# Patient Record
Sex: MALE | Race: WHITE | Employment: FULL TIME | URBAN - METROPOLITAN AREA
[De-identification: names, ages, dates, MRNs, and addresses within clinical notes are randomized per-mention and may not be internally consistent; named-entity substitution may affect disease eponyms.]

---

## 2021-02-10 ENCOUNTER — OFFICE VISIT (OUTPATIENT)
Dept: FAMILY MEDICINE CLINIC | Age: 46
End: 2021-02-10
Payer: COMMERCIAL

## 2021-02-10 VITALS
TEMPERATURE: 97.3 F | HEART RATE: 81 BPM | BODY MASS INDEX: 38.17 KG/M2 | HEIGHT: 73 IN | SYSTOLIC BLOOD PRESSURE: 140 MMHG | WEIGHT: 288 LBS | OXYGEN SATURATION: 96 % | DIASTOLIC BLOOD PRESSURE: 84 MMHG

## 2021-02-10 DIAGNOSIS — F32.1 CURRENT MODERATE EPISODE OF MAJOR DEPRESSIVE DISORDER, UNSPECIFIED WHETHER RECURRENT (HCC): ICD-10-CM

## 2021-02-10 DIAGNOSIS — E66.9 OBESITY (BMI 35.0-39.9 WITHOUT COMORBIDITY): ICD-10-CM

## 2021-02-10 DIAGNOSIS — F17.210 CIGARETTE NICOTINE DEPENDENCE WITHOUT COMPLICATION: ICD-10-CM

## 2021-02-10 DIAGNOSIS — Z76.89 ENCOUNTER TO ESTABLISH CARE: Primary | ICD-10-CM

## 2021-02-10 DIAGNOSIS — Z76.89 ENCOUNTER TO ESTABLISH CARE: ICD-10-CM

## 2021-02-10 DIAGNOSIS — R73.9 HYPERGLYCEMIA: ICD-10-CM

## 2021-02-10 LAB
ALBUMIN SERPL-MCNC: 4.7 G/DL (ref 3.5–5.2)
ALP BLD-CCNC: 66 U/L (ref 40–129)
ALT SERPL-CCNC: 52 U/L (ref 0–40)
ANION GAP SERPL CALCULATED.3IONS-SCNC: 8 MMOL/L (ref 7–16)
AST SERPL-CCNC: 28 U/L (ref 0–39)
BASOPHILS ABSOLUTE: 0.08 E9/L (ref 0–0.2)
BASOPHILS RELATIVE PERCENT: 0.8 % (ref 0–2)
BILIRUB SERPL-MCNC: 0.3 MG/DL (ref 0–1.2)
BUN BLDV-MCNC: 11 MG/DL (ref 6–20)
CALCIUM SERPL-MCNC: 9.7 MG/DL (ref 8.6–10.2)
CHLORIDE BLD-SCNC: 103 MMOL/L (ref 98–107)
CHOLESTEROL, TOTAL: 193 MG/DL (ref 0–199)
CHP ED QC CHECK: NORMAL
CO2: 26 MMOL/L (ref 22–29)
CREAT SERPL-MCNC: 1.1 MG/DL (ref 0.7–1.2)
EOSINOPHILS ABSOLUTE: 0.22 E9/L (ref 0.05–0.5)
EOSINOPHILS RELATIVE PERCENT: 2.1 % (ref 0–6)
GFR AFRICAN AMERICAN: >60
GFR NON-AFRICAN AMERICAN: >60 ML/MIN/1.73
GLUCOSE BLD-MCNC: 71 MG/DL (ref 74–99)
GLUCOSE BLD-MCNC: 87 MG/DL
HBA1C MFR BLD: 6.2 %
HCT VFR BLD CALC: 52.4 % (ref 37–54)
HDLC SERPL-MCNC: 44 MG/DL
HEMOGLOBIN: 18.1 G/DL (ref 12.5–16.5)
IMMATURE GRANULOCYTES #: 0.06 E9/L
IMMATURE GRANULOCYTES %: 0.6 % (ref 0–5)
LDL CHOLESTEROL CALCULATED: 120 MG/DL (ref 0–99)
LYMPHOCYTES ABSOLUTE: 4.07 E9/L (ref 1.5–4)
LYMPHOCYTES RELATIVE PERCENT: 39.4 % (ref 20–42)
MCH RBC QN AUTO: 32.2 PG (ref 26–35)
MCHC RBC AUTO-ENTMCNC: 34.5 % (ref 32–34.5)
MCV RBC AUTO: 93.2 FL (ref 80–99.9)
MONOCYTES ABSOLUTE: 0.9 E9/L (ref 0.1–0.95)
MONOCYTES RELATIVE PERCENT: 8.7 % (ref 2–12)
NEUTROPHILS ABSOLUTE: 5 E9/L (ref 1.8–7.3)
NEUTROPHILS RELATIVE PERCENT: 48.4 % (ref 43–80)
PDW BLD-RTO: 12.8 FL (ref 11.5–15)
PLATELET # BLD: 268 E9/L (ref 130–450)
PMV BLD AUTO: 10.9 FL (ref 7–12)
POTASSIUM SERPL-SCNC: 4.8 MMOL/L (ref 3.5–5)
RBC # BLD: 5.62 E12/L (ref 3.8–5.8)
SODIUM BLD-SCNC: 137 MMOL/L (ref 132–146)
TOTAL PROTEIN: 8.1 G/DL (ref 6.4–8.3)
TRIGL SERPL-MCNC: 145 MG/DL (ref 0–149)
TSH SERPL DL<=0.05 MIU/L-ACNC: 1.63 UIU/ML (ref 0.27–4.2)
VITAMIN D 25-HYDROXY: 15 NG/ML (ref 30–100)
VLDLC SERPL CALC-MCNC: 29 MG/DL
WBC # BLD: 10.3 E9/L (ref 4.5–11.5)

## 2021-02-10 PROCEDURE — 82962 GLUCOSE BLOOD TEST: CPT | Performed by: FAMILY MEDICINE

## 2021-02-10 PROCEDURE — 99203 OFFICE O/P NEW LOW 30 MIN: CPT | Performed by: FAMILY MEDICINE

## 2021-02-10 PROCEDURE — 83036 HEMOGLOBIN GLYCOSYLATED A1C: CPT | Performed by: FAMILY MEDICINE

## 2021-02-10 PROCEDURE — G8427 DOCREV CUR MEDS BY ELIG CLIN: HCPCS | Performed by: FAMILY MEDICINE

## 2021-02-10 PROCEDURE — G8484 FLU IMMUNIZE NO ADMIN: HCPCS | Performed by: FAMILY MEDICINE

## 2021-02-10 PROCEDURE — 4004F PT TOBACCO SCREEN RCVD TLK: CPT | Performed by: FAMILY MEDICINE

## 2021-02-10 PROCEDURE — G8417 CALC BMI ABV UP PARAM F/U: HCPCS | Performed by: FAMILY MEDICINE

## 2021-02-10 RX ORDER — QUETIAPINE 200 MG/1
200 TABLET, FILM COATED, EXTENDED RELEASE ORAL NIGHTLY
COMMUNITY
End: 2021-02-10 | Stop reason: SDUPTHER

## 2021-02-10 RX ORDER — QUETIAPINE 200 MG/1
200 TABLET, FILM COATED, EXTENDED RELEASE ORAL NIGHTLY
Qty: 30 TABLET | Refills: 0 | Status: SHIPPED
Start: 2021-02-10 | End: 2021-03-15 | Stop reason: SDUPTHER

## 2021-02-10 SDOH — ECONOMIC STABILITY: INCOME INSECURITY: HOW HARD IS IT FOR YOU TO PAY FOR THE VERY BASICS LIKE FOOD, HOUSING, MEDICAL CARE, AND HEATING?: NOT ASKED

## 2021-02-10 SDOH — ECONOMIC STABILITY: FOOD INSECURITY: WITHIN THE PAST 12 MONTHS, THE FOOD YOU BOUGHT JUST DIDN'T LAST AND YOU DIDN'T HAVE MONEY TO GET MORE.: SOMETIMES TRUE

## 2021-02-10 ASSESSMENT — PATIENT HEALTH QUESTIONNAIRE - PHQ9
SUM OF ALL RESPONSES TO PHQ QUESTIONS 1-9: 1
SUM OF ALL RESPONSES TO PHQ QUESTIONS 1-9: 1

## 2021-02-10 NOTE — PATIENT INSTRUCTIONS
LOW SALT,LOW CARB. AND LOW FAT DIET. CONTINUE CURRENT MEDICATIONS TAKING REGULARLY. REGULAR WALKING ADVISED. ADVISED WEIGHT REDUCTION. ADVISED TO QUIT SMOKING. FASTING FOR LAB WORK ONE MORNING. NEXT APPOINTMENT IN 1 MONTH.

## 2021-02-10 NOTE — PROGRESS NOTES
OFFICE PROGRESS NOTE      SUBJECTIVE:        Patient ID:   James Dutton is a 41 y.o. male who presents for   Chief Complaint   Patient presents with   [facility]  preview. ingrown toe nail pain, diabetes type 2, pt brother ferrtin levels are alwatys low wants it checked     Health Maintenance     Tdap due,lipid screen, flu shot (no), Hep c screen due            HPI:     FEELS GOOD. ON MEDICATIONS FOR MOOD CONTROL. BLOOD SUGAR FLUCTUATES AND DOES GO TOO LOW OFF AND ON.  WATCHING DIET GOOD. PHYSICALLY ACTIVE JOB  FOR EXERCISE. TAKING MEDICATIONS REGULARLY. SMOKING 1/2 PACK DAILY. TRYING TO SLOW DOWN. Prior to Admission medications    Medication Sig Start Date End Date Taking? Authorizing Provider   QUEtiapine (SEROQUEL XR) 200 MG extended release tablet Take 200 mg by mouth nightly   Yes Historical Provider, MD   sertraline (ZOLOFT) 50 MG tablet Take 50 mg by mouth daily   Yes Historical Provider, MD     Social History     Socioeconomic History    Marital status:      Spouse name: None    Number of children: None    Years of education: None    Highest education level: None   Occupational History    None   Social Needs    Financial resource strain: None    Food insecurity     Worry: Sometimes true     Inability: Sometimes true    Transportation needs     Medical: No     Non-medical: No   Tobacco Use    Smoking status: Current Every Day Smoker     Packs/day: 0.25     Years: 15.00     Pack years: 3.75     Types: Cigarettes    Smokeless tobacco: Never Used   Substance and Sexual Activity    Alcohol use:  Yes     Alcohol/week: 1.0 standard drinks     Types: 1 Shots of liquor per week     Comment: once a onth    Drug use: Never    Sexual activity: Yes     Partners: Female   Lifestyle    Physical activity     Days per week: None     Minutes per session: None    Stress: None   Relationships    Social connections Head: Normocephalic. No masses, lesions or tenderness noted  Eyes: Conjunctivae appear normal. PERLE  Ears: External ears normal  Nose/Sinuses: Nares normal. Septum midline. Mucosa normal. No drainage  Oropharynx: Oropharynx clear with no exudate seen  Neck: Neck supple. No jugular venous distension, lymphadenopathy or thyromegaly Trachea midline  Chest:  Normal. Movements are Normal and Equal.  Lungs: Lungs clear to auscultation bilaterally. No ronchi, crackles or wheezes  Heart: S1 S2  Regular rate and rhythm. No rub, murmur or gallop  Abdomen: Abdomen soft, non-tender. BS normal. No masses, organomegaly. Back: Grossly Normal and Equal. DTR are Normal. SLR is Normal.  Extremities:Pedal pulses are normal.  Musculoskeletal: Muscular strength appears intact. No joint effusion, tenderness, swelling or warmth  Neuro:  No focal motor or sensory deficits        ASSESSMENT     Patient Active Problem List    Diagnosis Date Noted    Current moderate episode of major depressive disorder ([facility] 02/10/2021    Obesity (BMI 35.0-39.9 without comorbidity) 02/10/2021    Cigarette nicotine dependence without complication 56/68/9838        Diagnosis:     ICD-10-CM    1. Encounter to establish care  Z76.89 CBC Auto Differential     Comprehensive Metabolic Panel     Lipid Panel     Vitamin D 25 Hydroxy     TSH without Reflex     T4   2. Hyperglycemia  R73.9 POCT glycosylated hemoglobin (Hb A1C)     POCT Glucose   3. Current moderate episode of major depressive disorder, unspecified whether recurrent ([facility]  F32.1    4. Obesity (BMI 35.0-39.9 without comorbidity)  E66.9    5. Cigarette nicotine dependence without complication  I60.452        PLAN:           Patient Instructions   LOW SALT,LOW CARB. AND LOW FAT DIET. CONTINUE CURRENT MEDICATIONS TAKING REGULARLY. REGULAR WALKING ADVISED. ADVISED WEIGHT REDUCTION. ADVISED TO QUIT SMOKING. FASTING FOR LAB WORK ONE MORNING. NEXT APPOINTMENT IN 1 MONTH.

## 2021-03-15 ENCOUNTER — OFFICE VISIT (OUTPATIENT)
Dept: FAMILY MEDICINE CLINIC | Age: 46
End: 2021-03-15
Payer: COMMERCIAL

## 2021-03-15 VITALS
SYSTOLIC BLOOD PRESSURE: 122 MMHG | WEIGHT: 295 LBS | HEIGHT: 73 IN | BODY MASS INDEX: 39.1 KG/M2 | HEART RATE: 89 BPM | TEMPERATURE: 97 F | DIASTOLIC BLOOD PRESSURE: 88 MMHG | RESPIRATION RATE: 16 BRPM | OXYGEN SATURATION: 97 %

## 2021-03-15 DIAGNOSIS — E55.9 HYPOVITAMINOSIS D: ICD-10-CM

## 2021-03-15 DIAGNOSIS — Z11.59 NEED FOR HEPATITIS C SCREENING TEST: ICD-10-CM

## 2021-03-15 DIAGNOSIS — F17.210 CIGARETTE NICOTINE DEPENDENCE WITHOUT COMPLICATION: ICD-10-CM

## 2021-03-15 DIAGNOSIS — D58.2 ELEVATED HEMOGLOBIN (HCC): ICD-10-CM

## 2021-03-15 DIAGNOSIS — E66.9 OBESITY (BMI 35.0-39.9 WITHOUT COMORBIDITY): ICD-10-CM

## 2021-03-15 DIAGNOSIS — F32.1 CURRENT MODERATE EPISODE OF MAJOR DEPRESSIVE DISORDER, UNSPECIFIED WHETHER RECURRENT (HCC): ICD-10-CM

## 2021-03-15 DIAGNOSIS — E78.00 PRIMARY HYPERCHOLESTEROLEMIA: Primary | ICD-10-CM

## 2021-03-15 DIAGNOSIS — Z11.4 ENCOUNTER FOR SCREENING FOR HIV: ICD-10-CM

## 2021-03-15 PROCEDURE — G8427 DOCREV CUR MEDS BY ELIG CLIN: HCPCS | Performed by: FAMILY MEDICINE

## 2021-03-15 PROCEDURE — 4004F PT TOBACCO SCREEN RCVD TLK: CPT | Performed by: FAMILY MEDICINE

## 2021-03-15 PROCEDURE — G8484 FLU IMMUNIZE NO ADMIN: HCPCS | Performed by: FAMILY MEDICINE

## 2021-03-15 PROCEDURE — G8417 CALC BMI ABV UP PARAM F/U: HCPCS | Performed by: FAMILY MEDICINE

## 2021-03-15 PROCEDURE — 99214 OFFICE O/P EST MOD 30 MIN: CPT | Performed by: FAMILY MEDICINE

## 2021-03-15 RX ORDER — CHOLECALCIFEROL (VITAMIN D3) 1250 MCG
1 CAPSULE ORAL WEEKLY
Qty: 12 CAPSULE | Refills: 1 | Status: SHIPPED
Start: 2021-03-15 | End: 2021-07-12 | Stop reason: SDUPTHER

## 2021-03-15 RX ORDER — QUETIAPINE 200 MG/1
200 TABLET, FILM COATED, EXTENDED RELEASE ORAL NIGHTLY
Qty: 30 TABLET | Refills: 3 | Status: SHIPPED
Start: 2021-03-15 | End: 2021-07-12 | Stop reason: SDUPTHER

## 2021-03-15 NOTE — PROGRESS NOTES
OFFICE PROGRESS NOTE      SUBJECTIVE:        Patient ID:   Daxa Richard is a 41 y.o. male who presents for   Chief Complaint   Patient presents with   [address] [facility] Maintenance     due for hep c and pneum and dtap and hiv     Weight Loss     would like some assistants           HPI:     FEELS GOOD. WATCHING DIET BUT NOT GOOD. PHYSICALLY ACTIVE  FOR EXERCISE. TAKING MEDICATIONS REGULARLY. DISCUSSED AND ADVISED ON DIET CONTROL AND EXERCISE. Prior to Admission medications    Medication Sig Start Date End Date Taking? Authorizing Provider   Cholecalciferol (VITAMIN D3) 1.25 MG (15852 UT) CAPS Take 1 capsule by mouth once a week 3/15/21  Yes Carmel Tovar MD   QUEtiapine (SEROQUEL XR) 200 MG extended release tablet Take 1 tablet by mouth nightly 3/15/21  Yes Carmel Tovar MD   sertraline (ZOLOFT) 50 MG tablet Take 1 tablet by mouth daily 3/15/21  Yes Carmel Tovar MD     Social History     Socioeconomic History    Marital status:      Spouse name: None    Number of children: None    Years of education: None    Highest education level: None   Occupational History    None   Social Needs    Financial resource strain: None    Food insecurity     Worry: Sometimes true     Inability: Sometimes true    Transportation needs     Medical: No     Non-medical: No   Tobacco Use    Smoking status: Current Every Day Smoker     Packs/day: 0.25     Years: 15.00     Pack years: 3.75     Types: Cigarettes    Smokeless tobacco: Never Used   Substance and Sexual Activity    Alcohol use:  Yes     Alcohol/week: 1.0 standard drinks     Types: 1 Shots of liquor per week     Comment: once a onth    Drug use: Never    Sexual activity: Yes     Partners: Female   Lifestyle    Physical activity     Days per week: None     Minutes per session: None    Stress: None   Relationships    Social connections     Talks on phone: None     Gets together: None Attends Jehovah's witness service: None     Active member of club or organization: None     Attends meetings of clubs or organizations: None     Relationship status: None    Intimate partner violence     Fear of current or ex partner: None     Emotionally abused: None     Physically abused: None     Forced sexual activity: None   Other Topics Concern    None   Social History Narrative    None       I have reviewed Selvin's allergies, medications, problem list, medical, social and family history and have updated as needed in the electronic medical record  Review Of Systems:    Skin: no abnormal pigmentation, rash, scaling, itching, masses, hair or nail changes  Eyes: no blurring, diplopia, or eye pain  Ears/Nose/Throat: no hearing loss, tinnitus, vertigo, nosebleed, nasal congestion, rhinorrhea, sore throat  Respiratory: no cough, pleuritic chest pain, dyspnea, or wheezing  Cardiovascular: no chest pain, angina, dyspnea on exertion, orthopnea, PND, palpitations, or claudication  Gastrointestinal: no nausea, vomiting, heartburn, diarrhea, constipation, bloating,  abdominal pain, or blood per rectum. Appetite is good  Genitourinary: no urinary urgency, frequency, dysuria, nocturia, hesitancy, or incontinence  Musculoskeletal:  Ambulating well  Neurologic: no paralysis, paresis, paresthesia, seizures, tremors, or headaches  Hematologic/Lymphatic/Immunologic: no anemia, abnormal bleeding/bruising, fever, chills, night sweats, swollen glands, or unexplained weight loss  Endocrine: no heat or cold intolerance and no polyphagia, polydipsia, or polyuria        OBJECTIVE:     VS:  Wt Readings from Last 3 Encounters:   03/15/21 295 lb (133.8 kg)   02/10/21 288 lb (130.6 kg)     Temp Readings from Last 3 Encounters:   03/15/21 97 °F (36.1 °C)   02/10/21 97.3 °F (36.3 °C) (Temporal)     BP Readings from Last 3 Encounters:   03/15/21 122/88   02/10/21 (!) 140/84        General appearance: Alert, Awake, Oriented times 3, no distress Skin: Warm and dry  Head: Normocephalic. No masses, lesions or tenderness noted  Eyes: Conjunctivae appear normal. PERLE  Ears: External ears normal  Nose/Sinuses: Nares normal. Septum midline. Mucosa normal. No drainage  Oropharynx: Oropharynx clear with no exudate seen  Neck: Neck supple. No jugular venous distension, lymphadenopathy or thyromegaly Trachea midline  Chest:  Normal. Movements are Normal and Equal.  Lungs: Lungs clear to auscultation bilaterally. No ronchi, crackles or wheezes  Heart: S1 S2  Regular rate and rhythm. No rub, murmur or gallop  Abdomen: Abdomen soft, non-tender. BS normal. No masses, organomegaly. Back: Grossly Normal and Equal. DTR are Normal. SLR is Normal.  Extremities: NORMAL Pedal pulses are normal.  Musculoskeletal: Muscular strength appears intact. No joint effusion, tenderness, swelling or warmth  Neuro:  No focal motor or sensory deficits        ASSESSMENT     Patient Active Problem List    Diagnosis Date Noted    Hypovitaminosis D 03/15/2021    Current moderate episode of major depressive disorder ([facility] 02/10/2021    Obesity (BMI 35.0-39.9 without comorbidity) 02/10/2021    Cigarette nicotine dependence without complication 92/30/7170        Diagnosis:     ICD-10-CM    1. Primary hypercholesterolemia  E78.00 Comprehensive Metabolic Panel     Lipid Panel    DIET CONTROLLED   2. Elevated hemoglobin (HCC)  D58.2 CBC Auto Differential    NEW FINDING. 3. Current moderate episode of major depressive disorder, unspecified whether recurrent (HCC)  F32.1     STABLE   4. Obesity (BMI 35.0-39.9 without comorbidity)  E66.9     STABLE   5. Cigarette nicotine dependence without complication  Z69.999     ACTIVE    6. Hypovitaminosis D  E55.9 Vitamin D 25 Hydroxy    LOW   7. Encounter for screening for HIV  Z11.4 HIV-1 AND HIV-2 ANTIBODIES   8. Need for hepatitis C screening test  Z11.59 HEPATITIS C ANTIBODY       PLAN:           Patient Instructions   LOW SALT,LOW CARB.   AND LOW FAT DIET.  CONTINUE CURRENT MEDICATIONS TAKING REGULARLY. REGULAR WALKING ADVISED. ADVISED WEIGHT REDUCTION. FASTING FOR LAB WORK PRIOR TO NEXT VISIT. NEXT APPOINTMENT IN 3 MONTHS. Return in about 3 months (around 6/15/2021). I have reviewed my findings and recommendations with Cullen Sue.     Electronically signed by Alexandra Gill MD on 3/15/21 at 2:08 PM EDT

## 2021-05-25 DIAGNOSIS — Z11.59 NEED FOR HEPATITIS C SCREENING TEST: ICD-10-CM

## 2021-05-25 DIAGNOSIS — E55.9 HYPOVITAMINOSIS D: ICD-10-CM

## 2021-05-25 DIAGNOSIS — D58.2 ELEVATED HEMOGLOBIN (HCC): ICD-10-CM

## 2021-05-25 DIAGNOSIS — Z11.4 ENCOUNTER FOR SCREENING FOR HIV: ICD-10-CM

## 2021-05-25 DIAGNOSIS — E78.00 PRIMARY HYPERCHOLESTEROLEMIA: ICD-10-CM

## 2021-05-25 LAB
ALBUMIN SERPL-MCNC: 4.2 G/DL (ref 3.5–5.2)
ALP BLD-CCNC: 73 U/L (ref 40–129)
ALT SERPL-CCNC: 32 U/L (ref 0–40)
ANION GAP SERPL CALCULATED.3IONS-SCNC: 16 MMOL/L (ref 7–16)
AST SERPL-CCNC: 30 U/L (ref 0–39)
BASOPHILS ABSOLUTE: 0.08 E9/L (ref 0–0.2)
BASOPHILS RELATIVE PERCENT: 0.7 % (ref 0–2)
BILIRUB SERPL-MCNC: 0.6 MG/DL (ref 0–1.2)
BUN BLDV-MCNC: 11 MG/DL (ref 6–20)
CALCIUM SERPL-MCNC: 9.1 MG/DL (ref 8.6–10.2)
CHLORIDE BLD-SCNC: 107 MMOL/L (ref 98–107)
CHOLESTEROL, TOTAL: 192 MG/DL (ref 0–199)
CO2: 20 MMOL/L (ref 22–29)
CREAT SERPL-MCNC: 1.1 MG/DL (ref 0.7–1.2)
EOSINOPHILS ABSOLUTE: 0.2 E9/L (ref 0.05–0.5)
EOSINOPHILS RELATIVE PERCENT: 1.8 % (ref 0–6)
GFR AFRICAN AMERICAN: >60
GFR NON-AFRICAN AMERICAN: >60 ML/MIN/1.73
GLUCOSE BLD-MCNC: 95 MG/DL (ref 74–99)
HCT VFR BLD CALC: 48.7 % (ref 37–54)
HDLC SERPL-MCNC: 45 MG/DL
HEMOGLOBIN: 16.1 G/DL (ref 12.5–16.5)
IMMATURE GRANULOCYTES #: 0.05 E9/L
IMMATURE GRANULOCYTES %: 0.5 % (ref 0–5)
LDL CHOLESTEROL CALCULATED: 126 MG/DL (ref 0–99)
LYMPHOCYTES ABSOLUTE: 4.38 E9/L (ref 1.5–4)
LYMPHOCYTES RELATIVE PERCENT: 39.7 % (ref 20–42)
MCH RBC QN AUTO: 32 PG (ref 26–35)
MCHC RBC AUTO-ENTMCNC: 33.1 % (ref 32–34.5)
MCV RBC AUTO: 96.8 FL (ref 80–99.9)
MONOCYTES ABSOLUTE: 0.94 E9/L (ref 0.1–0.95)
MONOCYTES RELATIVE PERCENT: 8.5 % (ref 2–12)
NEUTROPHILS ABSOLUTE: 5.39 E9/L (ref 1.8–7.3)
NEUTROPHILS RELATIVE PERCENT: 48.8 % (ref 43–80)
PDW BLD-RTO: 13.5 FL (ref 11.5–15)
PLATELET # BLD: 274 E9/L (ref 130–450)
PMV BLD AUTO: 11.2 FL (ref 7–12)
POTASSIUM SERPL-SCNC: 4.2 MMOL/L (ref 3.5–5)
RBC # BLD: 5.03 E12/L (ref 3.8–5.8)
SODIUM BLD-SCNC: 143 MMOL/L (ref 132–146)
TOTAL PROTEIN: 7.3 G/DL (ref 6.4–8.3)
TRIGL SERPL-MCNC: 107 MG/DL (ref 0–149)
VITAMIN D 25-HYDROXY: 30 NG/ML (ref 30–100)
VLDLC SERPL CALC-MCNC: 21 MG/DL
WBC # BLD: 11 E9/L (ref 4.5–11.5)

## 2021-05-26 LAB
HEPATITIS C ANTIBODY INTERPRETATION: NORMAL
HIV-1 AND HIV-2 ANTIBODIES: NORMAL

## 2021-06-07 ENCOUNTER — TELEPHONE (OUTPATIENT)
Dept: ADMINISTRATIVE | Age: 46
End: 2021-06-07

## 2021-07-12 ENCOUNTER — OFFICE VISIT (OUTPATIENT)
Dept: FAMILY MEDICINE CLINIC | Age: 46
End: 2021-07-12
Payer: COMMERCIAL

## 2021-07-12 VITALS
WEIGHT: 276 LBS | BODY MASS INDEX: 36.58 KG/M2 | HEART RATE: 97 BPM | SYSTOLIC BLOOD PRESSURE: 120 MMHG | DIASTOLIC BLOOD PRESSURE: 88 MMHG | HEIGHT: 73 IN | TEMPERATURE: 97 F | RESPIRATION RATE: 16 BRPM | OXYGEN SATURATION: 98 %

## 2021-07-12 DIAGNOSIS — F17.210 CIGARETTE NICOTINE DEPENDENCE WITHOUT COMPLICATION: ICD-10-CM

## 2021-07-12 DIAGNOSIS — E78.00 PRIMARY HYPERCHOLESTEROLEMIA: Primary | ICD-10-CM

## 2021-07-12 DIAGNOSIS — E55.9 HYPOVITAMINOSIS D: ICD-10-CM

## 2021-07-12 DIAGNOSIS — F32.1 CURRENT MODERATE EPISODE OF MAJOR DEPRESSIVE DISORDER, UNSPECIFIED WHETHER RECURRENT (HCC): ICD-10-CM

## 2021-07-12 DIAGNOSIS — E66.9 OBESITY (BMI 35.0-39.9 WITHOUT COMORBIDITY): ICD-10-CM

## 2021-07-12 PROCEDURE — 99213 OFFICE O/P EST LOW 20 MIN: CPT | Performed by: FAMILY MEDICINE

## 2021-07-12 PROCEDURE — G8427 DOCREV CUR MEDS BY ELIG CLIN: HCPCS | Performed by: FAMILY MEDICINE

## 2021-07-12 PROCEDURE — G8417 CALC BMI ABV UP PARAM F/U: HCPCS | Performed by: FAMILY MEDICINE

## 2021-07-12 PROCEDURE — 4004F PT TOBACCO SCREEN RCVD TLK: CPT | Performed by: FAMILY MEDICINE

## 2021-07-12 RX ORDER — CHOLECALCIFEROL (VITAMIN D3) 1250 MCG
1 CAPSULE ORAL WEEKLY
Qty: 12 CAPSULE | Refills: 1 | Status: SHIPPED | OUTPATIENT
Start: 2021-07-12

## 2021-07-12 RX ORDER — QUETIAPINE 200 MG/1
200 TABLET, FILM COATED, EXTENDED RELEASE ORAL NIGHTLY
Qty: 30 TABLET | Refills: 3 | Status: SHIPPED
Start: 2021-07-12 | End: 2021-10-25 | Stop reason: SDUPTHER

## 2021-07-12 SDOH — ECONOMIC STABILITY: INCOME INSECURITY: IN THE LAST 12 MONTHS, WAS THERE A TIME WHEN YOU WERE NOT ABLE TO PAY THE MORTGAGE OR RENT ON TIME?: NO

## 2021-07-12 SDOH — ECONOMIC STABILITY: HOUSING INSECURITY: IN THE LAST 12 MONTHS, HOW MANY PLACES HAVE YOU LIVED?: 1

## 2021-07-12 SDOH — ECONOMIC STABILITY: HOUSING INSECURITY
IN THE LAST 12 MONTHS, WAS THERE A TIME WHEN YOU DID NOT HAVE A STEADY PLACE TO SLEEP OR SLEPT IN A SHELTER (INCLUDING NOW)?: NO

## 2021-07-12 ASSESSMENT — SOCIAL DETERMINANTS OF HEALTH (SDOH): HOW HARD IS IT FOR YOU TO PAY FOR THE VERY BASICS LIKE FOOD, HOUSING, MEDICAL CARE, AND HEATING?: NOT HARD AT ALL

## 2021-07-12 ASSESSMENT — LIFESTYLE VARIABLES
HOW MANY STANDARD DRINKS CONTAINING ALCOHOL DO YOU HAVE ON A TYPICAL DAY: 1 OR 2
HOW OFTEN DO YOU HAVE A DRINK CONTAINING ALCOHOL: NEVER

## 2021-07-12 NOTE — PROGRESS NOTES
OFFICE PROGRESS NOTE      SUBJECTIVE:        Patient ID:   Phill Wilkinson is a 41 y.o. male who presents for   Chief Complaint   Patient presents with   [address] [facility] Maintenance     due for pneum and covid-19 and dtap           HPI:     FEELS GOOD. STARTED WATCHING DIET GOOD. LOST 20 LB. SINCE LAST VISIT 2 MONTHS AGO. WALKING FOR EXERCISE. TAKING MEDICATIONS REGULARLY. STILL SMOKING SAME. WILL CONSIDER QUITTING. Prior to Admission medications    Medication Sig Start Date End Date Taking? Authorizing Provider   Cholecalciferol (VITAMIN D3) 1.25 MG (07301 UT) CAPS Take 1 capsule by mouth once a week 7/12/21  Yes Yo Curtis MD   QUEtiapine (SEROQUEL XR) 200 MG extended release tablet Take 1 tablet by mouth nightly 7/12/21  Yes Yo Curtis MD   sertraline (ZOLOFT) 50 MG tablet Take 1 tablet by mouth daily 7/12/21  Yes Yo Curtis MD     Social History     Socioeconomic History    Marital status:      Spouse name: None    Number of children: None    Years of education: None    Highest education level: None   Occupational History    None   Tobacco Use    Smoking status: Current Every Day Smoker     Packs/day: 0.25     Years: 15.00     Pack years: 3.75     Types: Cigarettes    Smokeless tobacco: Never Used   Vaping Use    Vaping Use: Never used   Substance and Sexual Activity    Alcohol use:  Yes     Alcohol/week: 1.0 standard drinks     Types: 1 Shots of liquor per week     Comment: once a onth    Drug use: Never    Sexual activity: Yes     Partners: Female   Other Topics Concern    None   Social History Narrative    None     Social Determinants of Health     Financial Resource Strain: Low Risk     Difficulty of Paying Living Expenses: Not hard at all   Food Insecurity: Food Insecurity Present    Worried About [address] in the Last Year: Sometimes true    Mukul of Food in the Last Year: Sometimes true Transportation Needs: No Transportation Needs    Lack of Transportation (Medical): No    Lack of Transportation (Non-Medical): No   Physical Activity:     Days of Exercise per Week:     Minutes of Exercise per Session:    Stress:     Feeling of Stress :    Social Connections:     Frequency of Communication with Friends and Family:     Frequency of Social Gatherings with Friends and Family:     Attends Yazidism Services:     Active Member of Clubs or Organizations:     Attends Club or Organization Meetings:     Marital Status:    Intimate Partner Violence:     Fear of Current or Ex-Partner:     Emotionally Abused:     Physically Abused:     Sexually Abused:        I have reviewed Selvin's allergies, medications, problem list, medical, social and family history and have updated as needed in the electronic medical record  Review Of Systems:    Skin: no abnormal pigmentation, rash, scaling, itching, masses, hair or nail changes  Eyes: no blurring, diplopia, or eye pain  Ears/Nose/Throat: no hearing loss, tinnitus, vertigo, nosebleed, nasal congestion, rhinorrhea, sore throat  Respiratory: no cough, pleuritic chest pain, dyspnea, or wheezing  Cardiovascular: no chest pain, angina, dyspnea on exertion, orthopnea, PND, palpitations, or claudication  Gastrointestinal: no nausea, vomiting, heartburn, diarrhea, constipation, bloating,  abdominal pain, or blood per rectum. Appetite is good  Genitourinary: no urinary urgency, frequency, dysuria, nocturia, hesitancy, or incontinence  Musculoskeletal:Ambulating well  Neurologic: no paralysis, paresis, paresthesia, seizures, tremors, or headaches  Hematologic/Lymphatic/Immunologic: no anemia, abnormal bleeding/bruising, fever, chills, night sweats, swollen glands, or unexplained weight loss  Endocrine: no heat or cold intolerance and no polyphagia, polydipsia, or polyuria        OBJECTIVE:     VS:  Wt Readings from Last 3 Encounters:   07/12/21 276 lb (125.2 kg) 03/15/21 295 lb (133.8 kg)   02/10/21 288 lb (130.6 kg)     Temp Readings from Last 3 Encounters:   07/12/21 97 °F (36.1 °C)   03/15/21 97 °F (36.1 °C)   02/10/21 97.3 °F (36.3 °C) (Temporal)     BP Readings from Last 3 Encounters:   07/12/21 120/88   03/15/21 122/88   02/10/21 (!) 140/84        General appearance: Alert, Awake, Oriented times 3, no distress  Skin: Warm and dry  Head: Normocephalic. No masses, lesions or tenderness noted  Eyes: Conjunctivae appear normal. PERLE  Ears: External ears normal  Nose/Sinuses: Nares normal. Septum midline. Mucosa normal. No drainage  Oropharynx: Oropharynx clear with no exudate seen  Neck: Neck supple. No jugular venous distension, lymphadenopathy or thyromegaly Trachea midline  Chest:  Normal. Movements are Normal and Equal.  Lungs: Lungs clear to auscultation bilaterally. No ronchi, crackles or wheezes  Heart: S1 S2  Regular rate and rhythm. No rub, murmur or gallop  Abdomen: Abdomen soft, non-tender. BS normal. No masses, organomegaly. Back: Grossly Normal and Equal. DTR are Normal. SLR is Normal.  Extremities: NORMAL. Pedal pulses are normal.  Musculoskeletal: Muscular strength appears intact. No joint effusion, tenderness, swelling or warmth  Neuro:  No focal motor or sensory deficits        ASSESSMENT     Patient Active Problem List    Diagnosis Date Noted    Hypovitaminosis D 03/15/2021    Current moderate episode of major depressive disorder ([facility] 02/10/2021    Obesity (BMI 35.0-39.9 without comorbidity) 02/10/2021    Cigarette nicotine dependence without complication 79/55/5762        Diagnosis:     ICD-10-CM    1. Primary hypercholesterolemia  E78.00 Lipid Panel   2. Obesity (BMI 35.0-39.9 without comorbidity)  E66.9    3. Cigarette nicotine dependence without complication  K07.849    4. Hypovitaminosis D  E55.9 Vitamin D 25 Hydroxy   5.  Current moderate episode of major depressive disorder, unspecified whether recurrent ([facility]  F32.1        PLAN: Patient Instructions   LOW SALT,LOW CARB. AND LOW FAT DIET. CONTINUE CURRENT MEDICATIONS TAKING REGULARLY. REGULAR WALKING ADVISED. ADVISED WEIGHT REDUCTION. ADVISED TO QUIT SMOKING. FASTING FOR LAB WORK PRIOR TO NEXT VISIT. NEXT APPOINTMENT IN 3 MONTHS. Return in about 3 months (around 10/12/2021) for FOLLOW UP VISIT. I have reviewed my findings and recommendations with Edvin Angulo.     Electronically signed by Reid Awan MD on 7/12/21 at 2:09 PM EDT

## 2021-07-12 NOTE — PATIENT INSTRUCTIONS
LOW SALT,LOW CARB. AND LOW FAT DIET. CONTINUE CURRENT MEDICATIONS TAKING REGULARLY. REGULAR WALKING ADVISED. ADVISED WEIGHT REDUCTION. ADVISED TO QUIT SMOKING. FASTING FOR LAB WORK PRIOR TO NEXT VISIT. NEXT APPOINTMENT IN 3 MONTHS.

## 2021-10-12 ENCOUNTER — TELEPHONE (OUTPATIENT)
Dept: FAMILY MEDICINE CLINIC | Age: 46
End: 2021-10-12

## 2021-10-25 ENCOUNTER — OFFICE VISIT (OUTPATIENT)
Dept: FAMILY MEDICINE CLINIC | Age: 46
End: 2021-10-25
Payer: COMMERCIAL

## 2021-10-25 VITALS
WEIGHT: 278 LBS | BODY MASS INDEX: 36.84 KG/M2 | OXYGEN SATURATION: 98 % | TEMPERATURE: 97.7 F | RESPIRATION RATE: 16 BRPM | DIASTOLIC BLOOD PRESSURE: 84 MMHG | HEIGHT: 73 IN | HEART RATE: 74 BPM | SYSTOLIC BLOOD PRESSURE: 132 MMHG

## 2021-10-25 DIAGNOSIS — E66.9 OBESITY (BMI 35.0-39.9 WITHOUT COMORBIDITY): ICD-10-CM

## 2021-10-25 DIAGNOSIS — F32.1 CURRENT MODERATE EPISODE OF MAJOR DEPRESSIVE DISORDER, UNSPECIFIED WHETHER RECURRENT (HCC): ICD-10-CM

## 2021-10-25 DIAGNOSIS — F17.210 CIGARETTE NICOTINE DEPENDENCE WITHOUT COMPLICATION: ICD-10-CM

## 2021-10-25 DIAGNOSIS — E55.9 HYPOVITAMINOSIS D: ICD-10-CM

## 2021-10-25 DIAGNOSIS — E78.00 PRIMARY HYPERCHOLESTEROLEMIA: Primary | ICD-10-CM

## 2021-10-25 PROCEDURE — G8484 FLU IMMUNIZE NO ADMIN: HCPCS | Performed by: FAMILY MEDICINE

## 2021-10-25 PROCEDURE — G8427 DOCREV CUR MEDS BY ELIG CLIN: HCPCS | Performed by: FAMILY MEDICINE

## 2021-10-25 PROCEDURE — G8417 CALC BMI ABV UP PARAM F/U: HCPCS | Performed by: FAMILY MEDICINE

## 2021-10-25 PROCEDURE — 4004F PT TOBACCO SCREEN RCVD TLK: CPT | Performed by: FAMILY MEDICINE

## 2021-10-25 PROCEDURE — 99214 OFFICE O/P EST MOD 30 MIN: CPT | Performed by: FAMILY MEDICINE

## 2021-10-25 RX ORDER — QUETIAPINE 200 MG/1
200 TABLET, FILM COATED, EXTENDED RELEASE ORAL NIGHTLY
Qty: 30 TABLET | Refills: 3 | Status: SHIPPED
Start: 2021-10-25 | End: 2021-10-25 | Stop reason: SDUPTHER

## 2021-10-25 RX ORDER — QUETIAPINE 200 MG/1
200 TABLET, FILM COATED, EXTENDED RELEASE ORAL NIGHTLY
Qty: 30 TABLET | Refills: 3 | Status: SHIPPED
Start: 2021-10-25 | End: 2022-07-28 | Stop reason: SDUPTHER

## 2021-10-25 NOTE — PROGRESS NOTES
OFFICE PROGRESS NOTE      SUBJECTIVE:        Patient ID:   Royce Choi is a 41 y.o. male who presents for   Chief Complaint   Patient presents with    Follow-up     check up   [facility] Maintenance     declines flu shot            HPI:     RECHECK BP, CHOLESTEROL AND WEIGHT. FORGOT TO GET BLOOD TEST ORDERED LAST VISIT. MEDICATION REFILL. NEEDS MEDICATIONS PRESCRIBED BY THE PSYCHIATRIST FOR MOOD CONTROL. MOOD WELL CONTROLLED WITH THE MEDICATION. FEELS GOOD. WATCHING DIET GOOD. PHYSICALLY ACTIVE JOB  FOR EXERCISE. TAKING MEDICATIONS REGULARLY. Prior to Admission medications    Medication Sig Start Date End Date Taking? Authorizing Provider   sertraline (ZOLOFT) 50 MG tablet Take 1 tablet by mouth daily 10/25/21  Yes Juanis Hurtado MD   QUEtiapine (SEROQUEL XR) 200 MG extended release tablet Take 1 tablet by mouth nightly 10/25/21  Yes Juanis Hurtado MD   Cholecalciferol (VITAMIN D3) 1.25 MG (01685 UT) CAPS Take 1 capsule by mouth once a week 7/12/21   Juanis Hurtado MD     Social History     Socioeconomic History    Marital status:      Spouse name: None    Number of children: None    Years of education: None    Highest education level: None   Occupational History    None   Tobacco Use    Smoking status: Current Every Day Smoker     Packs/day: 0.25     Years: 15.00     Pack years: 3.75     Types: Cigarettes    Smokeless tobacco: Never Used   Vaping Use    Vaping Use: Never used   Substance and Sexual Activity    Alcohol use:  Yes     Alcohol/week: 1.0 standard drinks     Types: 1 Shots of liquor per week     Comment: once a onth    Drug use: Never    Sexual activity: Yes     Partners: Female   Other Topics Concern    None   Social History Narrative    None     Social Determinants of Health     Financial Resource Strain: Low Risk     Difficulty of Paying Living Expenses: Not hard at all   Food Insecurity: Food Insecurity Present    Worried About [address] [facility] in the Last Year: Sometimes true    Mukul of Food in the Last Year: Sometimes true   Transportation Needs: No Transportation Needs    Lack of Transportation (Medical): No    Lack of Transportation (Non-Medical): No   Physical Activity:     Days of Exercise per Week:     Minutes of Exercise per Session:    Stress:     Feeling of Stress :    Social Connections:     Frequency of Communication with Friends and Family:     Frequency of Social Gatherings with Friends and Family:     Attends Pentecostalism Services:     Active Member of Clubs or Organizations:     Attends Club or Organization Meetings:     Marital Status:    Intimate Partner Violence:     Fear of Current or Ex-Partner:     Emotionally Abused:     Physically Abused:     Sexually Abused:        I have reviewed Selvin's allergies, medications, problem list, medical, social and family history and have updated as needed in the electronic medical record  Review Of Systems:    Skin: no abnormal pigmentation, rash, scaling, itching, masses, hair or nail changes  Eyes: no blurring, diplopia, or eye pain  Ears/Nose/Throat: no hearing loss, tinnitus, vertigo, nosebleed, nasal congestion, rhinorrhea, sore throat  Respiratory: no cough, pleuritic chest pain, dyspnea, or wheezing  Cardiovascular: no chest pain, angina, dyspnea on exertion, orthopnea, PND, palpitations, or claudication  Gastrointestinal: no nausea, vomiting, heartburn, diarrhea, constipation, bloating,  abdominal pain, or blood per rectum. Appetite is good  Genitourinary: no urinary urgency, frequency, dysuria, nocturia, hesitancy, or incontinence  Musculoskeletal: Ambulating well  Neurologic: no paralysis, paresis, paresthesia, seizures, tremors, or headaches  Hematologic/Lymphatic/Immunologic: no anemia, abnormal bleeding/bruising, fever, chills, night sweats, swollen glands, or unexplained weight loss  Endocrine: no heat or cold intolerance and no polyphagia, polydipsia, or polyuria        OBJECTIVE:     VS:  Wt Readings from Last 3 Encounters:   10/25/21 278 lb (126.1 kg)   07/12/21 276 lb (125.2 kg)   03/15/21 295 lb (133.8 kg)     Temp Readings from Last 3 Encounters:   10/25/21 97.7 °F (36.5 °C)   07/12/21 97 °F (36.1 °C)   03/15/21 97 °F (36.1 °C)     BP Readings from Last 3 Encounters:   10/25/21 132/84   07/12/21 120/88   03/15/21 122/88        General appearance: Alert, Awake, Oriented times 3, no distress  Skin: Warm and dry  Head: Normocephalic. No masses, lesions or tenderness noted  Eyes: Conjunctivae appear normal. PERLE  Ears: External ears normal  Nose/Sinuses: Nares normal. Septum midline. Mucosa normal. No drainage  Oropharynx: Oropharynx clear with no exudate seen  Neck: Neck supple. No jugular venous distension, lymphadenopathy or thyromegaly Trachea midline  Chest:  Normal. Movements are Normal and Equal.  Lungs: Lungs clear to auscultation bilaterally. No ronchi, crackles or wheezes  Heart: S1 S2  Regular rate and rhythm. No rub, murmur or gallop  Abdomen: Abdomen soft, non-tender. BS normal. No masses, organomegaly. Back: Grossly Normal and Equal. DTR are Normal. SLR is Normal.  Extremities: NORMAL. Pedal pulses are normal.  Musculoskeletal: Muscular strength appears intact. No joint effusion, tenderness, swelling or warmth  Neuro:  No focal motor or sensory deficits        ASSESSMENT     Patient Active Problem List    Diagnosis Date Noted    Primary hypercholesterolemia 10/25/2021    Hypovitaminosis D 03/15/2021    Current moderate episode of major depressive disorder ([facility] [address] 02/10/2021    Obesity (BMI 35.0-39.9 without comorbidity) 02/10/2021    Cigarette nicotine dependence without complication 22/60/6629        Diagnosis:     ICD-10-CM    1. Primary hypercholesterolemia  E78.00     ? CONTROL   2. Hypovitaminosis D  E55.9     ? CONTROL   3. Cigarette nicotine dependence without complication  V03.248     ACTIVE    4.  Obesity (BMI 35.0-39.9 without comorbidity)  E66.9     STABLE   5. Current moderate episode of major depressive disorder, unspecified whether recurrent ([facility]  F32.1     STABLE       PLAN:           Patient Instructions   LOW SALT FOR BLOOD PRESSURE CONTROL. LOW CARBOHYDRATE FOR BLOOD SUGAR AND WEIGHT CONTROL. LOW FAT DIET FOR CHOLESTEROL CONTROL. TAKE  VITAMIN 85619 UNITS WEEKLY D-3 FOR IMPROVING DEFICIENCY. TAKE  SEROQUEL  MG. IN AM AND ZOLOFT 50 MG. NIGHTLY   FOR MOOD CONTROL. REGULAR WALKING ADVISED. ADVISED WEIGHT REDUCTION. ADVISED TO QUIT SMOKING. FASTING FOR LAB WORK ONE MORNING. NEXT APPOINTMENT IN 3 MONTHS. Return in about 3 months (around 1/25/2022) for FOLLOW UP VISIT. I have reviewed my findings and recommendations with Ta Mullen.     Electronically signed by Gio Thornton MD on 10/25/21 at 9:31 AM EDT

## 2021-10-25 NOTE — PATIENT INSTRUCTIONS
LOW SALT FOR BLOOD PRESSURE CONTROL. LOW CARBOHYDRATE FOR BLOOD SUGAR AND WEIGHT CONTROL. LOW FAT DIET FOR CHOLESTEROL CONTROL. TAKE  VITAMIN 76823 UNITS WEEKLY D-3 FOR IMPROVING DEFICIENCY. TAKE  SEROQUEL  MG. IN AM AND ZOLOFT 50 MG. NIGHTLY   FOR MOOD CONTROL. REGULAR WALKING ADVISED. ADVISED WEIGHT REDUCTION. ADVISED TO QUIT SMOKING. FASTING FOR LAB WORK ONE MORNING. NEXT APPOINTMENT IN 3 MONTHS.

## 2022-07-25 ENCOUNTER — TELEPHONE (OUTPATIENT)
Dept: PRIMARY CARE CLINIC | Age: 47
End: 2022-07-25

## 2022-07-25 NOTE — TELEPHONE ENCOUNTER
PC to patient to schedule appointment. Patient aware to contact current physician for refills until seen. Patient needing a morning appointment due to work schedule.

## 2022-07-25 NOTE — TELEPHONE ENCOUNTER
----- Message from [facility] sent at 7/25/2022 10:12 AM EDT -----  Subject: Appointment Request    Reason for Call: New Patient/New to Provider Appointment needed: New   Patient Request Appointment    QUESTIONS    Reason for appointment request? No appointments available during search     Additional Information for Provider? Darron Ruiz called on 07/25 @   10:08 am. He is looking to establish care with a PCP in [facility] after   moving to [place], [place]. Has seen a [facility] in the past 3 years   (Matt Phan). Would like to be seen early when available.  Needs   his medication refilled: sertraline (ZOLOFT) 50 MG tablet QUEtiapine   (SEROQUEL XR) 200 MG extended release tablet  ---------------------------------------------------------------------------  --------------  Katheryn [facility] [place]  [phone]; OK to leave message on voicemail  ---------------------------------------------------------------------------  --------------  SCRIPT ANSWERS  COVID Screen: Romi Patel

## 2022-07-25 NOTE — TELEPHONE ENCOUNTER
----- Message from Marina Kraus sent at 7/25/2022 10:19 AM EDT -----  Subject: Refill Request    QUESTIONS  Name of Medication? sertraline (ZOLOFT) 50 MG tablet  Patient-reported dosage and instructions? Take 1 tablet by mouth daily  How many days do you have left? 3  Preferred Pharmacy? [address] [place] [address] #[id number]  Pharmacy phone number (if available)? [phone]  Additional Information for Provider? Will be going to [facility] as a new   patient. Please discuss with new PCP about Prescriptions. Also wants to   send prescriptions to a new Pharmacy and if they accept his Insurance. [facility] [address] [phone]  ---------------------------------------------------------------------------  --------------,  Name of Medication? QUEtiapine (SEROQUEL XR) 200 MG extended release   tablet  Patient-reported dosage and instructions? Take 1 tablet by mouth nightly  How many days do you have left? 3  Preferred Pharmacy? [address] [place] [address] #[id number]  Pharmacy phone number (if available)? [phone]  Additional Information for Provider? Will be going to [facility] as a new   patient. Please discuss with new PCP about Prescriptions. Also wants to   send prescriptions to a new Pharmacy and if they accept his Insurance. [facility] [address] [place], [place] [address] [phone]  ---------------------------------------------------------------------------  --------------  Mary MODI  What is the best way for the office to contact you? OK to leave message on   voicemail  Preferred Call Back Phone Number? [phone]  ---------------------------------------------------------------------------  --------------  SCRIPT ANSWERS  Relationship to Patient?  Self

## 2022-07-26 NOTE — TELEPHONE ENCOUNTER
Patient is switching to the [place] office because it is closer but he cannot get in until [place] and they wont refill until they see him. Can you refill?

## 2022-07-28 RX ORDER — QUETIAPINE 200 MG/1
200 TABLET, FILM COATED, EXTENDED RELEASE ORAL NIGHTLY
Qty: 15 TABLET | Refills: 0 | Status: SHIPPED
Start: 2022-07-28 | End: 2022-08-04 | Stop reason: SDUPTHER

## 2022-07-28 NOTE — TELEPHONE ENCOUNTER
Patient called for refill    Last seen 10/25/2021  Next appt 9/27/2022  Dr. Hdez Seevilma Contreras's

## 2022-08-04 ENCOUNTER — OFFICE VISIT (OUTPATIENT)
Dept: FAMILY MEDICINE CLINIC | Age: 47
End: 2022-08-04
Payer: COMMERCIAL

## 2022-08-04 VITALS
OXYGEN SATURATION: 97 % | BODY MASS INDEX: 38.79 KG/M2 | DIASTOLIC BLOOD PRESSURE: 88 MMHG | SYSTOLIC BLOOD PRESSURE: 136 MMHG | HEART RATE: 78 BPM | WEIGHT: 294 LBS | TEMPERATURE: 97.3 F

## 2022-08-04 DIAGNOSIS — F32.1 CURRENT MODERATE EPISODE OF MAJOR DEPRESSIVE DISORDER, UNSPECIFIED WHETHER RECURRENT (HCC): ICD-10-CM

## 2022-08-04 DIAGNOSIS — E66.9 OBESITY (BMI 35.0-39.9 WITHOUT COMORBIDITY): ICD-10-CM

## 2022-08-04 DIAGNOSIS — E55.9 HYPOVITAMINOSIS D: ICD-10-CM

## 2022-08-04 DIAGNOSIS — F17.210 CIGARETTE NICOTINE DEPENDENCE WITHOUT COMPLICATION: ICD-10-CM

## 2022-08-04 DIAGNOSIS — E78.00 PRIMARY HYPERCHOLESTEROLEMIA: Primary | ICD-10-CM

## 2022-08-04 DIAGNOSIS — R73.9 HYPERGLYCEMIA: ICD-10-CM

## 2022-08-04 LAB — HBA1C MFR BLD: 6.8 %

## 2022-08-04 PROCEDURE — G8417 CALC BMI ABV UP PARAM F/U: HCPCS | Performed by: FAMILY MEDICINE

## 2022-08-04 PROCEDURE — 99213 OFFICE O/P EST LOW 20 MIN: CPT | Performed by: FAMILY MEDICINE

## 2022-08-04 PROCEDURE — G8427 DOCREV CUR MEDS BY ELIG CLIN: HCPCS | Performed by: FAMILY MEDICINE

## 2022-08-04 PROCEDURE — 83036 HEMOGLOBIN GLYCOSYLATED A1C: CPT | Performed by: FAMILY MEDICINE

## 2022-08-04 PROCEDURE — 4004F PT TOBACCO SCREEN RCVD TLK: CPT | Performed by: FAMILY MEDICINE

## 2022-08-04 RX ORDER — QUETIAPINE 200 MG/1
200 TABLET, FILM COATED, EXTENDED RELEASE ORAL NIGHTLY
Qty: 30 TABLET | Refills: 0 | Status: SHIPPED
Start: 2022-08-04 | End: 2022-10-04 | Stop reason: SDUPTHER

## 2022-08-04 SDOH — ECONOMIC STABILITY: FOOD INSECURITY: WITHIN THE PAST 12 MONTHS, YOU WORRIED THAT YOUR FOOD WOULD RUN OUT BEFORE YOU GOT MONEY TO BUY MORE.: NEVER TRUE

## 2022-08-04 SDOH — ECONOMIC STABILITY: FOOD INSECURITY: WITHIN THE PAST 12 MONTHS, THE FOOD YOU BOUGHT JUST DIDN'T LAST AND YOU DIDN'T HAVE MONEY TO GET MORE.: NEVER TRUE

## 2022-08-04 ASSESSMENT — PATIENT HEALTH QUESTIONNAIRE - PHQ9
3. TROUBLE FALLING OR STAYING ASLEEP: 3
SUM OF ALL RESPONSES TO PHQ QUESTIONS 1-9: 6
SUM OF ALL RESPONSES TO PHQ QUESTIONS 1-9: 6
9. THOUGHTS THAT YOU WOULD BE BETTER OFF DEAD, OR OF HURTING YOURSELF: 0
SUM OF ALL RESPONSES TO PHQ QUESTIONS 1-9: 6
4. FEELING TIRED OR HAVING LITTLE ENERGY: 3
10. IF YOU CHECKED OFF ANY PROBLEMS, HOW DIFFICULT HAVE THESE PROBLEMS MADE IT FOR YOU TO DO YOUR WORK, TAKE CARE OF THINGS AT HOME, OR GET ALONG WITH OTHER PEOPLE: 1
7. TROUBLE CONCENTRATING ON THINGS, SUCH AS READING THE NEWSPAPER OR WATCHING TELEVISION: 0
5. POOR APPETITE OR OVEREATING: 0
1. LITTLE INTEREST OR PLEASURE IN DOING THINGS: 0
2. FEELING DOWN, DEPRESSED OR HOPELESS: 0
8. MOVING OR SPEAKING SO SLOWLY THAT OTHER PEOPLE COULD HAVE NOTICED. OR THE OPPOSITE, BEING SO FIGETY OR RESTLESS THAT YOU HAVE BEEN MOVING AROUND A LOT MORE THAN USUAL: 0
6. FEELING BAD ABOUT YOURSELF - OR THAT YOU ARE A FAILURE OR HAVE LET YOURSELF OR YOUR FAMILY DOWN: 0
SUM OF ALL RESPONSES TO PHQ9 QUESTIONS 1 & 2: 0
SUM OF ALL RESPONSES TO PHQ QUESTIONS 1-9: 6

## 2022-08-04 ASSESSMENT — SOCIAL DETERMINANTS OF HEALTH (SDOH): HOW HARD IS IT FOR YOU TO PAY FOR THE VERY BASICS LIKE FOOD, HOUSING, MEDICAL CARE, AND HEATING?: VERY HARD

## 2022-08-04 NOTE — PROGRESS NOTES
OFFICE PROGRESS NOTE      SUBJECTIVE:        Patient ID:   Maxine Huang is a 40 y.o. male who presents for   Chief Complaint   Patient presents with    Medication Refill           HPI:     RECHECK MOOD CONTROL AND EXCESS BODY WEIGHT. NEEDS MEDICATION REFILL. FEELS GOOD. NOT WATCHING DIET GOOD. NO  EXERCISE.  NOT TAKING MEDICATIONS REGULARLY. SMOKING 1/2 PACK DAILY. NO REASON FOR NOT GETTING BLOOD WORK DONE AS ORDERED ;LAST VISIT. PLANNING TO GO TO CLINIC IN [place] FOR PCP FOLLOW UP. Prior to Admission medications    Medication Sig Start Date End Date Taking? Authorizing Provider   QUEtiapine (SEROQUEL XR) 200 MG extended release tablet Take 1 tablet by mouth nightly 8/4/22  Yes Odell Burroughs MD   sertraline (ZOLOFT) 50 MG tablet Take 1 tablet by mouth in the morning. 8/4/22  Yes Odell Burroughs MD   Cholecalciferol (VITAMIN D3) 1.25 MG (26541 UT) CAPS Take 1 capsule by mouth once a week  Patient not taking: Reported on 8/4/2022 7/12/21   Odell Burroughs MD     Social History     Socioeconomic History    Marital status:    Tobacco Use    Smoking status: Every Day     Packs/day: 0.25     Years: 15.00     Pack years: 3.75     Types: Cigarettes    Smokeless tobacco: Never   Vaping Use    Vaping Use: Never used   Substance and Sexual Activity    Alcohol use:  Yes     Alcohol/week: 1.0 standard drink     Types: 1 Shots of liquor per week     Comment: once a onth    Drug use: Never    Sexual activity: Yes     Partners: Female     Social Determinants of Health     Financial Resource Strain: High Risk    Difficulty of Paying Living Expenses: Very hard   Food Insecurity: No Food Insecurity    Worried About Running Out of Food in the Last Year: Never true    Ran Out of Food in the Last Year: Never true       I have reviewed Heidis allergies, medications, problem list, medical, social and family history and have updated as needed in the electronic medical wheezes  Heart: S1 S2  Regular rate and rhythm. No rub, murmur or gallop  Abdomen: Abdomen soft, non-tender. BS normal. No masses, organomegaly. Back: Grossly Normal and Equal. DTR are Normal. SLR is Normal.  Extremities: NORMAL. Pedal pulses are normal.  Musculoskeletal: Muscular strength appears intact. No joint effusion, tenderness, swelling or warmth  Neuro:  No focal motor or sensory deficits        ASSESSMENT     Patient Active Problem List    Diagnosis Date Noted    Primary hypercholesterolemia 10/25/2021    Hypovitaminosis D 03/15/2021    Current moderate episode of major depressive disorder ([facility] 02/10/2021    Obesity (BMI 35.0-39.9 without comorbidity) 02/10/2021    Cigarette nicotine dependence without complication 66/69/9310        Diagnosis:     ICD-10-CM    1. Primary hypercholesterolemia  E78.00       2. Hypovitaminosis D  E55.9       3. Obesity (BMI 35.0-39.9 without comorbidity)  E66.9       4. Current moderate episode of major depressive disorder, unspecified whether recurrent ([facility]  F32.1       5. Cigarette nicotine dependence without complication  H88.131       6. Hyperglycemia  R73.9 POCT glycosylated hemoglobin (Hb A1C)          PLAN:           Patient Instructions     LOW CARBOHYDRATE FOR  WEIGHT CONTROL. LOW FAT DIET FOR CHOLESTEROL CONTROL. DRINK ENOUGH FLUIDS FOR BETTER KIDNEY FUNCTION. TAKE  MEDICATION AS PER PSYCHIATRIST FOR MOOD CONTROL. REGULAR EXERCISE  ADVISED. ADVISED WEIGHT REDUCTION. ADVISED TO QUIT SMOKING. KEEP NEXT APPOINTMENT WITH CLINIC IN [place] AS SCHEDULED. Return for Tessa Rehman 1942 IN [place] AS SCHEDULED. .         I have reviewed my findings and recommendations with Maxine Huang.     Electronically signed by Odell Burroughs MD on 8/4/22 at 3:26 PM EDT

## 2022-08-04 NOTE — PATIENT INSTRUCTIONS
LOW CARBOHYDRATE FOR  WEIGHT CONTROL. LOW FAT DIET FOR CHOLESTEROL CONTROL. DRINK ENOUGH FLUIDS FOR BETTER KIDNEY FUNCTION. TAKE  MEDICATION AS PER PSYCHIATRIST FOR MOOD CONTROL. REGULAR EXERCISE  ADVISED. ADVISED WEIGHT REDUCTION. ADVISED TO QUIT SMOKING. KEEP NEXT APPOINTMENT WITH CLINIC IN [place] AS SCHEDULED.

## 2022-09-27 ENCOUNTER — OFFICE VISIT (OUTPATIENT)
Dept: PRIMARY CARE CLINIC | Age: 47
End: 2022-09-27
Payer: COMMERCIAL

## 2022-09-27 VITALS
DIASTOLIC BLOOD PRESSURE: 84 MMHG | SYSTOLIC BLOOD PRESSURE: 138 MMHG | HEART RATE: 74 BPM | BODY MASS INDEX: 38.06 KG/M2 | HEIGHT: 73 IN | WEIGHT: 287.2 LBS | OXYGEN SATURATION: 98 % | TEMPERATURE: 97.2 F

## 2022-09-27 DIAGNOSIS — Z12.5 PROSTATE CANCER SCREENING: ICD-10-CM

## 2022-09-27 DIAGNOSIS — Z12.11 COLON CANCER SCREENING: ICD-10-CM

## 2022-09-27 DIAGNOSIS — Z76.89 ENCOUNTER TO ESTABLISH CARE: ICD-10-CM

## 2022-09-27 DIAGNOSIS — Z76.89 ENCOUNTER TO ESTABLISH CARE: Primary | ICD-10-CM

## 2022-09-27 LAB
HCT VFR BLD CALC: 53.1 % (ref 37–54)
HEMOGLOBIN: 17.2 G/DL (ref 12.5–16.5)
MCH RBC QN AUTO: 31.4 PG (ref 26–35)
MCHC RBC AUTO-ENTMCNC: 32.4 % (ref 32–34.5)
MCV RBC AUTO: 97.1 FL (ref 80–99.9)
PDW BLD-RTO: 12.8 FL (ref 11.5–15)
PLATELET # BLD: 289 E9/L (ref 130–450)
PMV BLD AUTO: 11 FL (ref 7–12)
RBC # BLD: 5.47 E12/L (ref 3.8–5.8)
WBC # BLD: 10.7 E9/L (ref 4.5–11.5)

## 2022-09-27 PROCEDURE — G8427 DOCREV CUR MEDS BY ELIG CLIN: HCPCS | Performed by: FAMILY MEDICINE

## 2022-09-27 PROCEDURE — G8417 CALC BMI ABV UP PARAM F/U: HCPCS | Performed by: FAMILY MEDICINE

## 2022-09-27 PROCEDURE — 99214 OFFICE O/P EST MOD 30 MIN: CPT | Performed by: FAMILY MEDICINE

## 2022-09-27 PROCEDURE — 4004F PT TOBACCO SCREEN RCVD TLK: CPT | Performed by: FAMILY MEDICINE

## 2022-09-27 NOTE — PROGRESS NOTES
Subjective:  40 y.o. male who presents to the office today with chief complaint:  Chief Complaint   Patient presents with    New Patient     Previously seeing Dr. Ophelia Redd. Here to establish. No issues at this time. Past Medical History: HLD, PTSD    Medications: Seroquel 200mg qhs, zoloft 50mg    Allergies: NKDA    Surgeries: Cholecystectomy in [place] 8-9 years ago- uncertain of physician. Hospitalizations: Gallstone pancreatitis as above    Family History: Mother-  Lymphoma. Father-  DMII, Renal failure. 2 brothers and 2 sisters- healthly    Social:     Education: 12th grade- [place], [place]    Employment: [employer] [address] [employer] on [employer]. Works for [email or web address]     Diet: Described as \"semi-healthy\". Cooks at home on a daily basis. Eats fruits and vegetables. Exercise: at work    Caffeine: Diet pepsi and a cup of coffee occasionally     Tobacco: 1/2 pack per day. No motivation to quit. Alcohol: Rare- once every 3 months    Illicit Drugs: None. Sex History: None. Health Maintenance Due   Topic Date Due    COVID-19 Vaccine (1) Never done    Pneumococcal 0-64 years Vaccine (1 - PCV) Never done    DTaP/Tdap/Td vaccine (1 - Tdap) Never done    Colorectal Cancer Screen  Never done    Flu vaccine (1) Never done       Cancer History: None. Family Cancer History: Mother Lymphoma. Review of Systems    Review of Systems: All bolded are positive, all others are negative. General:  Fever, chills, diaphoresis, fatigue, malaise, night sweats, weight loss  Psychological:  Anxiety, disorientation, hallucinations. ENT:  Epistaxis, headaches, vertigo, visual changes. Cardiovascular:  Chest pain, irregular heartbeats, palpitations, paroxysmal nocturnal dyspnea. Respiratory:  Shortness of breath, coughing, sputum production, hemoptysis, wheezing, orthopnea.   Gastrointestinal:  Nausea, vomiting, diarrhea, heartburn, constipation, abdominal pain, hematemesis, hematochezia, melena, acholic stools  Genito-Urinary:  Dysuria, urgency, frequency, hematuria  Musculoskeletal:  Joint pain, joint stiffness, joint swelling, muscle pain  Neurology:  Headache, focal neurological deficits, weakness, numbness, paresthesia  Derm:  Rashes, ulcers, excoriations, bruising  Extremities:  Decreased ROM, peripheral edema, mottling      Objective:  Vitals:    09/27/22 0715   BP: 138/84   Pulse: 74   Temp: 97.2 °F (36.2 °C)   SpO2: 98%     Physical Exam  Constitutional:       General: He is not in acute distress. Appearance: He is well-developed. He is not diaphoretic. HENT:      Head: Normocephalic and atraumatic. Right Ear: Tympanic membrane, ear canal and external ear normal.      Left Ear: Tympanic membrane, ear canal and external ear normal.      Nose: Nose normal.      Mouth/Throat:      Pharynx: No oropharyngeal exudate. Eyes:      General:         Right eye: No discharge. Left eye: No discharge. Conjunctiva/sclera: Conjunctivae normal.      Pupils: Pupils are equal, round, and reactive to light. Cardiovascular:      Rate and Rhythm: Normal rate and regular rhythm. Pulses: Normal pulses. Heart sounds: Normal heart sounds. No murmur heard. No friction rub. No gallop. Pulmonary:      Effort: Pulmonary effort is normal. No respiratory distress. Breath sounds: Normal breath sounds. No wheezing or rales. Abdominal:      General: Bowel sounds are normal. There is no distension. Palpations: Abdomen is soft. Tenderness: There is no abdominal tenderness. There is no guarding or rebound. Musculoskeletal:      Cervical back: Normal range of motion and neck supple. Lymphadenopathy:      Cervical: No cervical adenopathy. Skin:     General: Skin is warm. Neurological:      Mental Status: He is alert and oriented to person, place, and time.    Psychiatric:         Mood and Affect: Mood normal. Behavior: Behavior normal.         Thought Content: Thought content normal.         Judgment: Judgment normal.         Results for orders placed or performed in visit on 08/04/22   POCT glycosylated hemoglobin (Hb A1C)   Result Value Ref Range    Hemoglobin A1C 6.8 %         Assessment and Plan:  Elizabeth Paez was seen today for new patient. Diagnoses and all orders for this visit:    Encounter to establish care  -     CBC; Future  -     Comprehensive Metabolic Panel; Future  -     TSH; Future  -     Lipid Panel; Future  -     Vitamin D 25 Hydroxy; Future    Colon cancer screening  -     Fecal DNA Colorectal cancer screening (Cologuard)    Prostate cancer screening  -     PSA Screening; Future      Encounter to establish care with new physician: Medical and social history discussed. Physical exam completed and found to be unremarkable. Labs ordered including CBC, CMP, TSH, lipid panel, PSA. Patient agreed to Cologuard testing. PTSD: Patient no longer has a psychiatrist for his current medication regimen. Patient will call when he needs refills of his Effexor and Seroquel. Return in about 3 months (around 12/27/2022). Patient may come in sooner if needed for medical concerns. Patient advised to call at any time to cancel, re-schedule, or for any questions/concerns.             Cas Johnson DO    9/27/22  9:03 AM

## 2022-09-28 LAB
ALBUMIN SERPL-MCNC: 4.6 G/DL (ref 3.5–5.2)
ALP BLD-CCNC: 100 U/L (ref 40–129)
ALT SERPL-CCNC: 65 U/L (ref 0–40)
ANION GAP SERPL CALCULATED.3IONS-SCNC: 14 MMOL/L (ref 7–16)
AST SERPL-CCNC: 46 U/L (ref 0–39)
BILIRUB SERPL-MCNC: 0.4 MG/DL (ref 0–1.2)
BUN BLDV-MCNC: 7 MG/DL (ref 6–20)
CALCIUM SERPL-MCNC: 9.7 MG/DL (ref 8.6–10.2)
CHLORIDE BLD-SCNC: 100 MMOL/L (ref 98–107)
CHOLESTEROL, TOTAL: 220 MG/DL (ref 0–199)
CO2: 23 MMOL/L (ref 22–29)
CREAT SERPL-MCNC: 0.9 MG/DL (ref 0.7–1.2)
GFR AFRICAN AMERICAN: >60
GFR NON-AFRICAN AMERICAN: >60 ML/MIN/1.73
GLUCOSE BLD-MCNC: 127 MG/DL (ref 74–99)
HDLC SERPL-MCNC: 33 MG/DL
LDL CHOLESTEROL CALCULATED: 155 MG/DL (ref 0–99)
POTASSIUM SERPL-SCNC: 5.1 MMOL/L (ref 3.5–5)
PROSTATE SPECIFIC ANTIGEN: 0.4 NG/ML (ref 0–4)
SODIUM BLD-SCNC: 137 MMOL/L (ref 132–146)
TOTAL PROTEIN: 7.8 G/DL (ref 6.4–8.3)
TRIGL SERPL-MCNC: 160 MG/DL (ref 0–149)
TSH SERPL DL<=0.05 MIU/L-ACNC: 2.82 UIU/ML (ref 0.27–4.2)
VITAMIN D 25-HYDROXY: 20 NG/ML (ref 30–100)
VLDLC SERPL CALC-MCNC: 32 MG/DL

## 2022-10-04 ENCOUNTER — TELEPHONE (OUTPATIENT)
Dept: PRIMARY CARE CLINIC | Age: 47
End: 2022-10-04

## 2022-10-04 RX ORDER — QUETIAPINE 200 MG/1
200 TABLET, FILM COATED, EXTENDED RELEASE ORAL NIGHTLY
Qty: 30 TABLET | Refills: 0 | OUTPATIENT
Start: 2022-10-04

## 2022-10-04 RX ORDER — QUETIAPINE 200 MG/1
200 TABLET, FILM COATED, EXTENDED RELEASE ORAL NIGHTLY
Qty: 90 TABLET | Refills: 1 | Status: SHIPPED | OUTPATIENT
Start: 2022-10-04

## 2022-10-13 LAB — NONINV COLON CA DNA+OCC BLD SCRN STL QL: NORMAL

## 2023-12-27 ENCOUNTER — HOSPITAL ENCOUNTER (EMERGENCY)
Age: 48
Discharge: HOME | End: 2023-12-27
Payer: COMMERCIAL

## 2023-12-27 VITALS
HEART RATE: 101 BPM | SYSTOLIC BLOOD PRESSURE: 126 MMHG | BODY MASS INDEX: 36.7 KG/M2 | WEIGHT: 286 LBS | OXYGEN SATURATION: 100 % | TEMPERATURE: 99.9 F | DIASTOLIC BLOOD PRESSURE: 80 MMHG | RESPIRATION RATE: 14 BRPM | HEIGHT: 74 IN

## 2023-12-27 DIAGNOSIS — J10.1 INFLUENZA A: Primary | ICD-10-CM

## 2023-12-27 LAB
INFLUENZA A BY PCR: DETECTED
INFLUENZA B BY PCR: NOT DETECTED
SARS-COV-2 RDRP RESP QL NAA+PROBE: NOT DETECTED
SPECIMEN DESCRIPTION: NORMAL

## 2023-12-27 PROCEDURE — 99283 EMERGENCY DEPT VISIT LOW MDM: CPT

## 2023-12-27 PROCEDURE — 87635 SARS-COV-2 COVID-19 AMP PRB: CPT

## 2023-12-27 PROCEDURE — 87502 INFLUENZA DNA AMP PROBE: CPT

## 2023-12-27 NOTE — ED PROVIDER NOTES
[facility] Visit. [address]   ED  Encounter Note  Admit Date/RoomTime: 2023 11:58 AM  ED Room: 32  NAME: Serina Costa  : 1975  MRN: [id number]  PCP: Meridee Meckel, DO    CHIEF COMPLAINT     Fever, Cough, and Generalized Body Aches    HISTORY OF PRESENT ILLNESS        Serina Costa is a 53 y.o. male who presents to the ED by private vehicle for congestion, cough and fever, beginning 2 day(s) ago. The complaint has been persistent and are moderate in severity. The patient states that he started getting symptoms the day after [place]. He reports cough, fever, body aches and congestion. He has not been vaccinated for COVID or flu. Smoker. Denies history of asthma or COPD. Denies chest pain or shortness of breath. REVIEW OF SYSTEMS     Pertinent positives and negatives are stated within HPI, all other systems reviewed and are negative. Past Medical History:  has a past medical history of Asthma, Depression, and Diabetes mellitus ([address] [facility]). Surgical History:  has no past surgical history on file. Social History:  reports that he has been smoking cigarettes. He has a 3.8 pack-year smoking history. He has never used smokeless tobacco. He reports current alcohol use of about 1.0 standard drink of alcohol per week. He reports that he does not use drugs. Family History: family history includes Cancer in his mother; Diabetes in his brother, father, and sister. Allergies: Patient has no known allergies.   CURRENT MEDICATIONS       Previous Medications    CHOLECALCIFEROL (VITAMIN D3) 1.25 MG (89994 UT) CAPS    Take 1 capsule by mouth once a week    QUETIAPINE (SEROQUEL XR) 200 MG EXTENDED RELEASE TABLET    Take 1 tablet by mouth nightly    SERTRALINE (ZOLOFT) 50 MG TABLET    Take 1 tablet by mouth daily       SCREENINGS     Yoseph Coma Scale  Eye Opening: Spontaneous  Best Verbal Response: Oriented  Best